# Patient Record
Sex: MALE | Race: BLACK OR AFRICAN AMERICAN | Employment: UNEMPLOYED | URBAN - METROPOLITAN AREA
[De-identification: names, ages, dates, MRNs, and addresses within clinical notes are randomized per-mention and may not be internally consistent; named-entity substitution may affect disease eponyms.]

---

## 2018-01-13 ENCOUNTER — HOSPITAL ENCOUNTER (EMERGENCY)
Age: 53
Discharge: HOME | End: 2018-01-13
Attending: EMERGENCY MEDICINE
Payer: SELF-PAY

## 2018-01-13 VITALS
SYSTOLIC BLOOD PRESSURE: 128 MMHG | RESPIRATION RATE: 18 BRPM | BODY MASS INDEX: 25.34 KG/M2 | TEMPERATURE: 98.2 F | DIASTOLIC BLOOD PRESSURE: 72 MMHG | HEART RATE: 96 BPM | HEIGHT: 65 IN | WEIGHT: 152.13 LBS | OXYGEN SATURATION: 97 %

## 2018-01-13 DIAGNOSIS — M54.42 ACUTE LEFT-SIDED LOW BACK PAIN WITH LEFT-SIDED SCIATICA: Primary | ICD-10-CM

## 2018-01-13 PROCEDURE — 74011250636 HC RX REV CODE- 250/636: Performed by: EMERGENCY MEDICINE

## 2018-01-13 PROCEDURE — 96372 THER/PROPH/DIAG INJ SC/IM: CPT

## 2018-01-13 PROCEDURE — 74011250637 HC RX REV CODE- 250/637: Performed by: EMERGENCY MEDICINE

## 2018-01-13 PROCEDURE — 99282 EMERGENCY DEPT VISIT SF MDM: CPT

## 2018-01-13 RX ORDER — TRAMADOL HYDROCHLORIDE 50 MG/1
50 TABLET ORAL
Status: COMPLETED | OUTPATIENT
Start: 2018-01-13 | End: 2018-01-13

## 2018-01-13 RX ORDER — IBUPROFEN 600 MG/1
600 TABLET ORAL
Qty: 20 TAB | Refills: 0 | Status: SHIPPED | OUTPATIENT
Start: 2018-01-13 | End: 2018-01-16

## 2018-01-13 RX ORDER — KETOROLAC TROMETHAMINE 30 MG/ML
60 INJECTION, SOLUTION INTRAMUSCULAR; INTRAVENOUS
Status: COMPLETED | OUTPATIENT
Start: 2018-01-13 | End: 2018-01-13

## 2018-01-13 RX ORDER — METHOCARBAMOL 750 MG/1
750 TABLET, FILM COATED ORAL 3 TIMES DAILY
Qty: 20 TAB | Refills: 0 | Status: SHIPPED | OUTPATIENT
Start: 2018-01-13 | End: 2018-01-16

## 2018-01-13 RX ORDER — TRAMADOL HYDROCHLORIDE 50 MG/1
50 TABLET ORAL
Qty: 10 TAB | Refills: 0 | Status: SHIPPED | OUTPATIENT
Start: 2018-01-13

## 2018-01-13 RX ORDER — METHOCARBAMOL 750 MG/1
750 TABLET, FILM COATED ORAL
Status: COMPLETED | OUTPATIENT
Start: 2018-01-13 | End: 2018-01-13

## 2018-01-13 RX ADMIN — TRAMADOL HYDROCHLORIDE 50 MG: 50 TABLET, FILM COATED ORAL at 19:05

## 2018-01-13 RX ADMIN — METHOCARBAMOL 750 MG: 750 TABLET ORAL at 19:04

## 2018-01-13 RX ADMIN — KETOROLAC TROMETHAMINE 60 MG: 30 INJECTION, SOLUTION INTRAMUSCULAR at 19:02

## 2018-01-13 NOTE — ED PROVIDER NOTES
Patient is a 46 y.o. male presenting with back pain and leg pain. Back Pain    Associated symptoms include leg pain. Pertinent negatives include no headaches, no abdominal pain and no dysuria. Leg Pain    Associated symptoms include back pain. Pertinent negatives include no neck pain. Pt reports that he has had low back pain/buttock pain for several days that radiates to the left leg. He states that he is HIV positive and followed at the [facility] clinic. He was evaluated at the clinic 2 days ago and was told that his pain was not related. He denies any direct trauma or injury. Denies fever, rash, abdominal pain or urinary symptoms. Denies any saddle anesthesia or changes in bowel or bladder habits. Denies any IV drug usage. Pain increases with bending, prolonged standing/sitting and position changes. Gait is steady; reflexes are normal. He states that he has not had any pain medications today prior to arrival.      Past Medical History:   Diagnosis Date    Asthma     Hepatitis C     HIV (human immunodeficiency virus infection) ([facility] [address]     Hypertension        Past Surgical History:   Procedure Laterality Date    HX APPENDECTOMY      HX HERNIA REPAIR      abdominal & groin         History reviewed. No pertinent family history. Social History     Social History    Marital status: SINGLE     Spouse name: N/A    Number of children: N/A    Years of education: N/A     Occupational History    Not on file. Social History Main Topics    Smoking status: Current Every Day Smoker     Packs/day: 1.00    Smokeless tobacco: Never Used    Alcohol use Yes      Comment: 6 x week    Drug use: No    Sexual activity: Not on file     Other Topics Concern    Not on file     Social History Narrative    No narrative on file         ALLERGIES: Review of patient's allergies indicates no known allergies. Review of Systems   Constitutional: Negative for activity change and appetite change.    HENT: Negative for facial swelling, sore throat and trouble swallowing. Eyes: Negative. Respiratory: Negative for shortness of breath. Cardiovascular: Negative. Gastrointestinal: Negative for abdominal pain, diarrhea and vomiting. Genitourinary: Negative for dysuria. Musculoskeletal: Positive for back pain. Negative for neck pain. Skin: Negative for color change. Neurological: Negative for headaches. Psychiatric/Behavioral: Negative. Vitals:    01/13/18 1554   BP: 128/72   Pulse: 96   Resp: 18   Temp: 98.2 °F (36.8 °C)   SpO2: 97%   Weight: 69 kg (152 lb 2 oz)   Height: 5' 5\" (1.651 m)            Physical Exam   Constitutional: He is oriented to person, place, and time. He appears well-nourished. Black male; smoker   HENT:   Head: Normocephalic. Right Ear: External ear normal.   Left Ear: External ear normal.   Mouth/Throat: Oropharynx is clear and moist.   Eyes: Pupils are equal, round, and reactive to light. Neck: Normal range of motion. Neck supple. Cardiovascular: Normal rate and regular rhythm. Pulmonary/Chest: Effort normal and breath sounds normal.   Abdominal: Soft. Bowel sounds are normal.   Musculoskeletal: Normal range of motion. Reports low back with radiculopathy to the left leg; Skin integrity is intact. There is no obvious deformity, rash, bruising or erythema. Good neurovascular sensation. Neurological: He is alert and oriented to person, place, and time. Skin: Skin is warm and dry. Nursing note and vitals reviewed. MDM  ED Course       Procedures      Suspect sciatica. Plan to treat conservatively with short course of NSAIDs, muscle relaxants and few pain pills. Recommend close orthopedic follow up.   6:53 PM  Patient's results and plan of care have been reviewed with him.   Patient has verbally conveyed his understanding and agreement of his signs, symptoms, diagnosis, treatment and prognosis and additionally agrees to follow up as recommended or return to the Emergency Room should his condition change prior to follow-up. Discharge instructions have also been provided to the patient with some educational information regarding his diagnosis as well a list of reasons why he would want to return to the ER prior to his follow-up appointment should his condition change. Lachelle Weldon NP

## 2018-01-13 NOTE — DISCHARGE INSTRUCTIONS
Back Pain, Emergency or Urgent Symptoms: Care Instructions  Your Care Instructions    Many people have back pain at one time or another. In most cases, pain gets better with self-care that includes over-the-counter pain medicine, ice, heat, and exercises. Unless you have symptoms of a severe injury or heart attack, you may be able to give yourself a few days before you call a doctor. But some back problems are very serious. Do not ignore symptoms that need to be checked right away. Follow-up care is a key part of your treatment and safety. Be sure to make and go to all appointments, and call your doctor if you are having problems. It's also a good idea to know your test results and keep a list of the medicines you take. How can you care for yourself at home? · Sit or lie in positions that are most comfortable and that reduce your pain. Try one of these positions when you lie down:  ¨ Lie on your back with your knees bent and supported by large pillows. ¨ Lie on the floor with your legs on the seat of a sofa or chair. Simeon Valyermo on your side with your knees and hips bent and a pillow between your legs. ¨ Lie on your stomach if it does not make pain worse. · Do not sit up in bed, and avoid soft couches and twisted positions. Bed rest can help relieve pain at first, but it delays healing. Avoid bed rest after the first day. · Change positions every 30 minutes. If you must sit for long periods of time, take breaks from sitting. Get up and walk around, or lie flat. · Try using a heating pad on a low or medium setting, for 15 to 20 minutes every 2 or 3 hours. Try a warm shower in place of one session with the heating pad. You can also buy single-use heat wraps that last up to 8 hours. You can also try ice or cold packs on your back for 10 to 20 minutes at a time, several times a day. (Put a thin cloth between the ice pack and your skin.) This reduces pain and makes it easier to be active and exercise.   · Take pain medicines exactly as directed. ¨ If the doctor gave you a prescription medicine for pain, take it as prescribed. ¨ If you are not taking a prescription pain medicine, ask your doctor if you can take an over-the-counter medicine. When should you call for help? Call 911 anytime you think you may need emergency care. For example, call if:  ? · You are unable to move a leg at all. ? · You have back pain with severe belly pain. ? · You have symptoms of a heart attack. These may include:  ¨ Chest pain or pressure, or a strange feeling in the chest.  ¨ Sweating. ¨ Shortness of breath. ¨ Nausea or vomiting. ¨ Pain, pressure, or a strange feeling in the back, neck, jaw, or upper belly or in one or both shoulders or arms. ¨ Lightheadedness or sudden weakness. ¨ A fast or irregular heartbeat. After you call 911, the  may tell you to chew 1 adult-strength or 2 to 4 low-dose aspirin. Wait for an ambulance. Do not try to drive yourself. ?Call your doctor now or seek immediate medical care if:  ? · You have new or worse symptoms in your arms, legs, chest, belly, or buttocks. Symptoms may include:  ¨ Numbness or tingling. ¨ Weakness. ¨ Pain. ? · You lose bladder or bowel control. ? · You have back pain and:  ¨ You have injured your back while lifting or doing some other activity. Call if the pain is severe, has not gone away after 1 or 2 days, and you cannot do your normal daily activities. ¨ You have had a back injury before that needed treatment. ¨ Your pain has lasted longer than 4 weeks. ¨ You have had weight loss you cannot explain. ¨ You are age 48 or older. ¨ You have cancer now or have had it before. ? Watch closely for changes in your health, and be sure to contact your doctor if you are not getting better as expected. Where can you learn more? Go to http://sanjeev-george.info/.   Enter X573 in the search box to learn more about \"Back Pain, Emergency or Urgent Symptoms: Care Instructions. \"  Current as of: March 20, 2017  Content Version: 11.4  © 9550-8033 [employer]. Care instructions adapted under license by [employer] (which disclaims liability or warranty for this information). If you have questions about a medical condition or this instruction, always ask your healthcare professional. [address] any warranty or liability for your use of this information. Sciatica: Care Instructions  Your Care Instructions    Sciatica (say \"yvg-VX-ca-kuh\") is an irritation of one of the sciatic nerves, which come from the spinal cord in the lower back. The sciatic nerves and their branches extend down through the buttock to the foot. Sciatica can develop when an injured disc in the back presses against a spinal nerve root. Its main symptom is pain, numbness, or weakness that is often worse in the leg or foot than in the back. Sciatica often will improve and go away with time. Early treatment usually includes medicines and exercises to relieve pain. Follow-up care is a key part of your treatment and safety. Be sure to make and go to all appointments, and call your doctor if you are having problems. It's also a good idea to know your test results and keep a list of the medicines you take. How can you care for yourself at home? · Take pain medicines exactly as directed. ¨ If the doctor gave you a prescription medicine for pain, take it as prescribed. ¨ If you are not taking a prescription pain medicine, ask your doctor if you can take an over-the-counter medicine. · Use heat or ice to relieve pain. ¨ To apply heat, put a warm water bottle, heating pad set on low, or warm cloth on your back. Do not go to sleep with a heating pad on your skin. ¨ To use ice, put ice or a cold pack on the area for 10 to 20 minutes at a time. Put a thin cloth between the ice and your skin.   · Avoid sitting if possible, unless it feels better than standing. · Alternate lying down with short walks. Increase your walking distance as you are able to without making your symptoms worse. · Do not do anything that makes your symptoms worse. When should you call for help? Call 911 anytime you think you may need emergency care. For example, call if:  · You are unable to move a leg at all. Call your doctor now or seek immediate medical care if:  · You have new or worse symptoms in your legs or buttocks. Symptoms may include:  ¨ Numbness or tingling. ¨ Weakness. ¨ Pain. · You lose bladder or bowel control. Watch closely for changes in your health, and be sure to contact your doctor if:  · You are not getting better as expected. Where can you learn more? Go to http://sanjeev-george.info/. Enter [phone] in the search box to learn more about \"Sciatica: Care Instructions. \"  Current as of: March 21, 2017  Content Version: 11.4  © 3823-6661 [employer]. Care instructions adapted under license by [employer] (which disclaims liability or warranty for this information). If you have questions about a medical condition or this instruction, always ask your healthcare professional. [address] any warranty or liability for your use of this information.

## 2018-01-14 NOTE — ED NOTES
Discharge instructions given by provider. Pt able to restate dc instructions. All questions answered. Pt stable for discharge.

## 2018-01-16 ENCOUNTER — HOSPITAL ENCOUNTER (EMERGENCY)
Age: 53
Discharge: HOME | End: 2018-01-16
Attending: EMERGENCY MEDICINE
Payer: SELF-PAY

## 2018-01-16 ENCOUNTER — APPOINTMENT (OUTPATIENT)
Dept: GENERAL RADIOLOGY | Age: 53
End: 2018-01-16
Attending: NURSE PRACTITIONER
Payer: SELF-PAY

## 2018-01-16 VITALS
DIASTOLIC BLOOD PRESSURE: 78 MMHG | TEMPERATURE: 98.7 F | BODY MASS INDEX: 26.33 KG/M2 | HEART RATE: 80 BPM | HEIGHT: 65 IN | OXYGEN SATURATION: 95 % | RESPIRATION RATE: 18 BRPM | SYSTOLIC BLOOD PRESSURE: 108 MMHG | WEIGHT: 158 LBS

## 2018-01-16 DIAGNOSIS — M54.17 RADICULOPATHY OF LUMBOSACRAL REGION: Primary | ICD-10-CM

## 2018-01-16 DIAGNOSIS — M54.32 SCIATICA OF LEFT SIDE: ICD-10-CM

## 2018-01-16 PROCEDURE — 99282 EMERGENCY DEPT VISIT SF MDM: CPT

## 2018-01-16 PROCEDURE — 72100 X-RAY EXAM L-S SPINE 2/3 VWS: CPT

## 2018-01-16 RX ORDER — METHOCARBAMOL 750 MG/1
750 TABLET, FILM COATED ORAL 3 TIMES DAILY
Qty: 20 TAB | Refills: 0 | Status: SHIPPED | OUTPATIENT
Start: 2018-01-16

## 2018-01-16 RX ORDER — SULFAMETHOXAZOLE AND TRIMETHOPRIM 800; 160 MG/1; MG/1
1 TABLET ORAL DAILY
COMMUNITY

## 2018-01-16 RX ORDER — AMLODIPINE BESYLATE 10 MG/1
10 TABLET ORAL DAILY
COMMUNITY

## 2018-01-16 RX ORDER — METHYLPREDNISOLONE 4 MG/1
TABLET ORAL
Qty: 1 DOSE PACK | Refills: 0 | Status: SHIPPED | OUTPATIENT
Start: 2018-01-16 | End: 2018-01-16

## 2018-01-16 RX ORDER — METHYLPREDNISOLONE 4 MG/1
TABLET ORAL
Qty: 1 DOSE PACK | Refills: 0 | Status: SHIPPED | OUTPATIENT
Start: 2018-01-16

## 2018-01-16 RX ORDER — HYDROCODONE BITARTRATE AND ACETAMINOPHEN 5; 325 MG/1; MG/1
1 TABLET ORAL
Qty: 12 TAB | Refills: 0 | Status: SHIPPED | OUTPATIENT
Start: 2018-01-16 | End: 2018-01-19

## 2018-01-16 RX ORDER — HYDROXYZINE 25 MG/1
25 TABLET, FILM COATED ORAL 2 TIMES DAILY
COMMUNITY

## 2018-01-16 NOTE — ED PROVIDER NOTES
HPI Comments: 46 y.o. male with past medical history significant for HIV, HTN, asthma and hepatitis C who presents ambulatory from home with chief complaint of back pain. States viral load is undetectable at last ID visit. Unsure of CD4 counts. Pt reports worsening left-sided lower back pain radiating into his left hip and left foot. Full ROM intact. Pt states pain has worsened since initial onset and previous ED visit on 1/13/18. Pt has been using prescribed tramadol, ibuprofen and robaxin with minimal relief. Pt denies making a f/u appointment with orthopedics. Pt's last viral load was undetectable 1 month ago. Pertinent negatives include fever, chills, nausea, vomiting, diarrhea, constipation, urinary incontinence, bladder incontinence, abd pain, and saddle anesthesia. There are no other acute medical concerns at this time. Old Chart Review:  Pt evaluated for the same on 1/13/18. Pt diagnosed with sciatica, prescribed robaxin, tramadol and ibuprofen for pain. He was instructed to f/u with Vaughn Burgess MD and Jake Thornton MD, orthopedic surgery on 1/20/18. Social hx: current every day tobacco smoker; daily EtOH use; denies illicit drug use  PCP: None  ID: Lesley Aviles at [facility]    Note written by Dariusz Jacome Scribe, as dictated by Nereyda Ramey NP 5:43 PM      The history is provided by the patient. No  was used. Past Medical History:   Diagnosis Date    Asthma     Hepatitis C     HIV [facility]human immunodeficiency virus infection) ([facility] [address]     Hypertension        Past Surgical History:   Procedure Laterality Date    HX APPENDECTOMY      HX HERNIA REPAIR      abdominal & groin         No family history on file. Social History     Social History    Marital status: SINGLE     Spouse name: N/A    Number of children: N/A    Years of education: N/A     Occupational History    Not on file.      Social History Main Topics    Smoking status: Current Every Day Smoker Packs/day: 1.00    Smokeless tobacco: Never Used    Alcohol use Yes      Comment: 6 x week    Drug use: No    Sexual activity: Not on file     Other Topics Concern    Not on file     Social History Narrative         ALLERGIES: Review of patient's allergies indicates no known allergies. Review of Systems   Constitutional: Negative for appetite change, chills and fever. HENT: Negative. Respiratory: Negative for cough, shortness of breath and wheezing. Cardiovascular: Negative for chest pain. Gastrointestinal: Negative for abdominal pain, constipation, diarrhea, nausea and vomiting. Genitourinary: Negative for dysuria and urgency. Musculoskeletal: Positive for back pain and gait problem. Skin: Negative for color change and rash. Neurological: Negative for dizziness and headaches. Psychiatric/Behavioral: Negative. All other systems reviewed and are negative. Vitals:    01/16/18 1714   BP: 113/76   Pulse: 94   Resp: 18   Temp: 98.5 °F (36.9 °C)   SpO2: 95%   Weight: 71.7 kg (158 lb)   Height: 5' 5\" (1.651 m)            Physical Exam   Constitutional: He is oriented to person, place, and time. He appears well-developed and well-nourished. HENT:   Head: Normocephalic and atraumatic. Neck: Normal range of motion. Neck supple. Cardiovascular: Normal rate, regular rhythm, normal heart sounds and intact distal pulses. Distal pulses intact. Pulmonary/Chest: Effort normal and breath sounds normal. No respiratory distress. He has no wheezes. He has no rales. He exhibits no tenderness. Abdominal: Soft. Bowel sounds are normal. There is no tenderness. There is no guarding. Musculoskeletal: Normal range of motion. Reproducible left lower back pain with straight leg raise and palpation of left buttock. Pain shoots down posterior left leg to foot. Neurological: He is alert and oriented to person, place, and time. Intact. Skin: Skin is warm and dry. No erythema.    Psychiatric: He has a normal mood and affect. His behavior is normal. Judgment and thought content normal.   Nursing note and vitals reviewed. Note written by John Barton, as dictated by Oralia Gagnon NP 5:43 PM     MDM  Number of Diagnoses or Management Options    ED Course     Assessment & Plan:   Lumbar films    Discussed with MD Katie Houser NP  01/16/18  6:01 PM    Procedures      Lspine films: Degenerative disc disease changes L4-5. Slight subluxation at this  Level. Medrol dose pack  Robaxin  norco    Follow-up with spine surgery. Gave resources with [facility] tiff and with [facility] since he sees ID there. 7:17 PM  The patient has been reevaluated. The patient is ready for discharge. The patient's signs, symptoms, diagnosis, and discharge instructions have been discussed and the patient/ family has conveyed their understanding. The patient is to follow up as recommended or return to the ED should their symptoms worsen. Plan has been discussed and the patient is in agreement. LABORATORY TESTS:  No results found for this or any previous visit (from the past 12 hour(s)). IMAGING RESULTS:  XR SPINE LUMB 2 OR 3 V   Final Result        Xr Spine Lumb 2 Or 3 V    Result Date: 1/16/2018  EXAM:  XR SPINE LUMB 2 OR 3 V INDICATION:   lumbar pain with sciatica COMPARISON: None. FINDINGS: AP, lateral and spot lateral views of the lumbar spine demonstrate 3 mm retrolisthesis of L4 on L5. There is decrease in the normal lordosis of the spine. Vertebral body height is preserved. There is degenerative disc disease change at L4-5 with loss of disc height. There is no fracture, subluxation or other abnormality. IMPRESSION:  Degenerative disc disease changes L4-5. Slight subluxation at this level. MEDICATIONS GIVEN:  Medications - No data to display    IMPRESSION:  1. Radiculopathy of lumbosacral region    2. Sciatica of left side        PLAN:  1.    Discharge Medication List as of 1/16/2018  7:11 PM      START taking these medications    Details   methylPREDNISolone (MEDROL, [facility]CY,) 4 mg tablet As directed on the package  Indications: sciatica, Print, Disp-1 Dose Pack, R-0      HYDROcodone-acetaminophen (NORCO) 5-325 mg per tablet Take 1 Tab by mouth every six (6) hours as needed for Pain for up to 3 days. Max Daily Amount: 4 Tabs., Print, Disp-12 Tab, R-0         CONTINUE these medications which have CHANGED    Details   methocarbamol (ROBAXIN-750) 750 mg tablet Take 1 Tab by mouth three (3) times daily. As needed for muscle spasm  Indications: Muscle Spasm, Normal, Disp-20 Tab, R-0         CONTINUE these medications which have NOT CHANGED    Details   skjpfkfcoq-ryfarcbo-xhiamf ala (ODEFSEY) 200-25-25 mg tab Take 1 Tab by mouth daily. Indications: HIV infection, Historical Med      amLODIPine (NORVASC) 10 mg tablet Take 10 mg by mouth daily. , Historical Med      hydrOXYzine HCl (ATARAX) 25 mg tablet Take 25 mg by mouth two (2) times a day., Historical Med      trimethoprim-sulfamethoxazole (BACTRIM DS) 160-800 mg per tablet Take 1 Tab by mouth daily. , Historical Med      traMADol (ULTRAM) 50 mg tablet Take 1 Tab by mouth every eight (8) hours as needed for Pain. Max Daily Amount: 150 mg., Print, Disp-10 Tab, R-0           2. Follow-up Information     Follow up With Details Comments Contact Info    MD Amita Long at [facility] ID clinic [address]      Tin [place] MD Schedule an appointment as soon as possible for a visit in 3 days for ortho spine follow-up at [facility] [address] [facility] [address] [facility]      Geo Rapp MD In 3 days U ortho spine follow-up Serenity Jones [address] [place] [address]  [phone]      [facility] [place] [facility] DEP  As needed, If symptoms worsen [address] [facility]  [phone]        3.  Discussed return precautions. Need to return if fevers or loss of bowel/bladder or saddle anesthesia.      Return to ED for new or worsening symptoms       Frederick Cortes NP

## 2018-01-16 NOTE — Clinical Note
Thank you for allowing us to care for you today. Please follow-up with your Primary Care provider in the next 2-3 days if your symptoms do not improve. Plan for home: Follow-up with Spine surgery as soon as possible Medrol dose pack as direct ed on package Norco for pain. You have been given a prescription for a small supply of narcotic pain medication. Narcotic pain medication is highly addictive. Please keep this medication in a safe place. You should only use this medication when in ext krishan pain. You should not drive or operate heavy machinery for 24 hours after taking this medication. This medication also has Tylenol in it. You should not take any additional Tylenol while on this medication that exceeds 3,000mg in a 24 hour period.

## 2018-01-16 NOTE — ED TRIAGE NOTES
Seen here just over a week ago for low back pain radiating into his left leg. He was told to come back if it did not get better. He says it has gotten worse.

## 2018-01-17 NOTE — DISCHARGE INSTRUCTIONS
